# Patient Record
Sex: FEMALE | Race: WHITE | NOT HISPANIC OR LATINO | URBAN - METROPOLITAN AREA
[De-identification: names, ages, dates, MRNs, and addresses within clinical notes are randomized per-mention and may not be internally consistent; named-entity substitution may affect disease eponyms.]

---

## 2021-01-14 ENCOUNTER — OFFICE VISIT (OUTPATIENT)
Dept: URBAN - METROPOLITAN AREA CLINIC 12 | Facility: CLINIC | Age: 51
End: 2021-01-14
Payer: COMMERCIAL

## 2021-01-14 ENCOUNTER — DASHBOARD ENCOUNTERS (OUTPATIENT)
Age: 51
End: 2021-01-14

## 2021-01-14 DIAGNOSIS — R10.13 ABDOMINAL DISCOMFORT, EPIGASTRIC: ICD-10-CM

## 2021-01-14 DIAGNOSIS — R76.8 HEPATITIS B ANTIBODY POSITIVE IN BLOOD: ICD-10-CM

## 2021-01-14 DIAGNOSIS — D50.8 ANEMIA, DUE TO INADEQUATE IRON INTAKE: ICD-10-CM

## 2021-01-14 DIAGNOSIS — K76.0 FATTY LIVER: ICD-10-CM

## 2021-01-14 PROCEDURE — 99204 OFFICE O/P NEW MOD 45 MIN: CPT | Performed by: INTERNAL MEDICINE

## 2021-01-14 RX ORDER — HYDROCHLOROTHIAZIDE 25 MG/1
1 TABLET IN THE MORNING TABLET ORAL ONCE A DAY
Status: ACTIVE | COMMUNITY

## 2021-01-14 RX ORDER — METOPROLOL TARTRATE 50 MG/1
1 TABLET WITH FOOD TABLET, FILM COATED ORAL TWICE A DAY
Status: ACTIVE | COMMUNITY

## 2021-01-14 RX ORDER — DEXLANSOPRAZOLE 60 MG/1
1 CAPSULE CAPSULE, DELAYED RELEASE ORAL ONCE A DAY
Status: ACTIVE | COMMUNITY

## 2021-01-14 NOTE — HPI-TODAY'S VISIT:
50-year-old female presented for evaluation of elevated liver enzyme and positive hepatitis B core antibody. Patient had intermittent bloating and mild  abdominal pain mostly after she eats . Her lab revealed mildly elevated AST and ALT. and mild anemia . She reports he had ultrasound about a year ago  which revealed fatty liver and gallstones. Never had endoscopy or colonoscopy ,NO  alcohol use she denies history of jaundice or family history of liver disease can Dexilant 60 mg a day w

## 2021-01-16 LAB
ACTIN (SMOOTH MUSCLE) ANTIBODY: 19
ALBUMIN: 4.5
ALKALINE PHOSPHATASE: 133
ALT (SGPT): 55
AST (SGOT): 53
BASO (ABSOLUTE): 0
BASOS: 1
BILIRUBIN, DIRECT: 0.15
BILIRUBIN, TOTAL: 0.4
DEAMIDATED GLIADIN ABS, IGA: 3
DEAMIDATED GLIADIN ABS, IGG: 2
ENDOMYSIAL ANTIBODY IGA: NEGATIVE
EOS (ABSOLUTE): 0.2
EOS: 2
FERRITIN, SERUM: 25
HBSAG SCREEN: NEGATIVE
HBV IU/ML: (no result)
HEMATOCRIT: 35.9
HEMATOLOGY COMMENTS:: (no result)
HEMOGLOBIN: 10.7
HEP BE AB: POSITIVE
HEP C VIRUS AB: <0.1
IMMATURE CELLS: (no result)
IMMATURE GRANS (ABS): 0
IMMATURE GRANULOCYTES: 1
IMMUNOGLOBULIN A, QN, SERUM: 182
LOG10 HBV IU/ML: (no result)
LYMPHS (ABSOLUTE): 2.2
LYMPHS: 26
MCH: 21.6
MCHC: 29.8
MCV: 73
MITOCHONDRIAL (M2) ANTIBODY: <20
MONOCYTES(ABSOLUTE): 0.5
MONOCYTES: 6
NEUTROPHILS (ABSOLUTE): 5.5
NEUTROPHILS: 64
NRBC: (no result)
PLATELETS: 445
PROTEIN, TOTAL: 8
RBC: 4.95
RDW: 16.9
T-TRANSGLUTAMINASE (TTG) IGA: <2
T-TRANSGLUTAMINASE (TTG) IGG: 4
TEST INFORMATION:: (no result)
WBC: 8.4

## 2021-01-17 PROBLEM — 197321007 FATTY LIVER: Status: ACTIVE | Noted: 2021-01-14

## 2021-01-17 PROBLEM — 87522002 IRON DEFICIENCY ANEMIA: Status: ACTIVE | Noted: 2021-01-17

## 2021-01-21 ENCOUNTER — LAB OUTSIDE AN ENCOUNTER (OUTPATIENT)
Dept: URBAN - METROPOLITAN AREA CLINIC 23 | Facility: CLINIC | Age: 51
End: 2021-01-21